# Patient Record
Sex: FEMALE | URBAN - METROPOLITAN AREA
[De-identification: names, ages, dates, MRNs, and addresses within clinical notes are randomized per-mention and may not be internally consistent; named-entity substitution may affect disease eponyms.]

---

## 2023-05-24 ENCOUNTER — OFFICE VISIT (OUTPATIENT)
Age: 68
End: 2023-05-24
Payer: MEDICARE

## 2023-05-24 VITALS
WEIGHT: 169 LBS | OXYGEN SATURATION: 97 % | RESPIRATION RATE: 17 BRPM | SYSTOLIC BLOOD PRESSURE: 130 MMHG | DIASTOLIC BLOOD PRESSURE: 80 MMHG | HEIGHT: 61 IN | BODY MASS INDEX: 31.91 KG/M2 | HEART RATE: 85 BPM

## 2023-05-24 DIAGNOSIS — R09.89 GLOBUS PHARYNGEUS: ICD-10-CM

## 2023-05-24 DIAGNOSIS — J38.7 VALLECULAR CYST: ICD-10-CM

## 2023-05-24 DIAGNOSIS — H60.8X3 CHRONIC ECZEMATOUS OTITIS EXTERNA OF BOTH EARS: ICD-10-CM

## 2023-05-24 DIAGNOSIS — J31.0 CHRONIC RHINITIS: ICD-10-CM

## 2023-05-24 DIAGNOSIS — R09.82 PND (POST-NASAL DRIP): ICD-10-CM

## 2023-05-24 DIAGNOSIS — E04.2 MULTINODULAR GOITER: Primary | ICD-10-CM

## 2023-05-24 PROCEDURE — G8427 DOCREV CUR MEDS BY ELIG CLIN: HCPCS | Performed by: OTOLARYNGOLOGY

## 2023-05-24 PROCEDURE — G8400 PT W/DXA NO RESULTS DOC: HCPCS | Performed by: OTOLARYNGOLOGY

## 2023-05-24 PROCEDURE — G8417 CALC BMI ABV UP PARAM F/U: HCPCS | Performed by: OTOLARYNGOLOGY

## 2023-05-24 PROCEDURE — 1036F TOBACCO NON-USER: CPT | Performed by: OTOLARYNGOLOGY

## 2023-05-24 PROCEDURE — 99204 OFFICE O/P NEW MOD 45 MIN: CPT | Performed by: OTOLARYNGOLOGY

## 2023-05-24 PROCEDURE — 1123F ACP DISCUSS/DSCN MKR DOCD: CPT | Performed by: OTOLARYNGOLOGY

## 2023-05-24 PROCEDURE — 3017F COLORECTAL CA SCREEN DOC REV: CPT | Performed by: OTOLARYNGOLOGY

## 2023-05-24 PROCEDURE — 1090F PRES/ABSN URINE INCON ASSESS: CPT | Performed by: OTOLARYNGOLOGY

## 2023-05-24 PROCEDURE — 31575 DIAGNOSTIC LARYNGOSCOPY: CPT | Performed by: OTOLARYNGOLOGY

## 2023-05-24 RX ORDER — EZETIMIBE 10 MG/1
TABLET ORAL
COMMUNITY
Start: 2023-04-03

## 2023-05-24 RX ORDER — LISINOPRIL 10 MG/1
TABLET ORAL
COMMUNITY
Start: 2023-04-24

## 2023-05-24 RX ORDER — AZELASTINE 1 MG/ML
1 SPRAY, METERED NASAL 2 TIMES DAILY
Qty: 60 ML | Refills: 1 | Status: SHIPPED | OUTPATIENT
Start: 2023-05-24

## 2023-05-24 RX ORDER — OMEPRAZOLE 40 MG/1
CAPSULE, DELAYED RELEASE ORAL
COMMUNITY
Start: 2023-03-30

## 2023-05-24 RX ORDER — DORZOLAMIDE HYDROCHLORIDE AND TIMOLOL MALEATE 20; 5 MG/ML; MG/ML
SOLUTION/ DROPS OPHTHALMIC
COMMUNITY

## 2023-05-24 RX ORDER — FLUOCINOLONE ACETONIDE 0.11 MG/ML
OIL AURICULAR (OTIC)
Qty: 20 ML | Refills: 0 | Status: SHIPPED | OUTPATIENT
Start: 2023-05-24

## 2023-05-24 RX ORDER — MONTELUKAST SODIUM 10 MG/1
10 TABLET ORAL NIGHTLY
Qty: 30 TABLET | Refills: 5 | Status: SHIPPED | OUTPATIENT
Start: 2023-05-24

## 2023-05-24 RX ORDER — ASPIRIN 81 MG/1
81 TABLET ORAL DAILY
COMMUNITY

## 2023-05-24 RX ORDER — AMLODIPINE BESYLATE 5 MG/1
TABLET ORAL
COMMUNITY

## 2023-05-24 ASSESSMENT — ENCOUNTER SYMPTOMS
TROUBLE SWALLOWING: 0
BACK PAIN: 0
EYE DISCHARGE: 0
VOICE CHANGE: 0
SINUS PAIN: 0
SINUS PRESSURE: 0
VOMITING: 0
APNEA: 0
COUGH: 0
SORE THROAT: 1
STRIDOR: 0
NAUSEA: 0
ABDOMINAL PAIN: 0
CHOKING: 0
PHOTOPHOBIA: 0
WHEEZING: 0
EYE ITCHING: 0
SHORTNESS OF BREATH: 0

## 2023-05-24 NOTE — PROGRESS NOTES
Subjective:    Chip Rodriguez   69 y.o.   1955     New Patient Visit    History of Present Illness:    5/24/23 - [address] office  68 yo female presents with ENT issues including ear itching, mucus in throat, occ sore throats; increased nasal mucus in the morning. Symptoms are worse at night. PPI seems to help at times. HOB elevation also helps. She has also been doing sinus rinse. PPI seemed to cause constipation. She has tried nasal steroids and mucinex in past.    Associated symptoms of a multinodular goiter, followed by surgery and endocrinology. She denies any trouble breathing as yet, but does have a globus sensation intermittently. Review of Systems  Review of Systems   Constitutional:  Negative for appetite change, chills, fatigue and fever. HENT:  Positive for postnasal drip and sore throat. Negative for congestion, ear discharge, ear pain, nosebleeds, sinus pressure, sinus pain, sneezing, tinnitus, trouble swallowing and voice change. Eyes:  Negative for photophobia, discharge, itching and visual disturbance. Respiratory:  Negative for apnea, cough, choking, shortness of breath, wheezing and stridor. Cardiovascular:  Negative for chest pain and palpitations. Gastrointestinal:  Negative for abdominal pain, nausea and vomiting. Endocrine: Negative for cold intolerance and heat intolerance. Genitourinary:  Negative for difficulty urinating and dysuria. Musculoskeletal:  Negative for arthralgias, back pain, gait problem, myalgias, neck pain and neck stiffness. Skin:  Negative for rash and wound. Allergic/Immunologic: Positive for environmental allergies. Negative for food allergies. Neurological:  Negative for dizziness, speech difficulty, light-headedness and headaches. Hematological:  Negative for adenopathy. Does not bruise/bleed easily. Psychiatric/Behavioral:  Negative for confusion and sleep disturbance. The patient is not nervous/anxious.           Past Medical

## 2023-07-11 ENCOUNTER — OFFICE VISIT (OUTPATIENT)
Age: 68
End: 2023-07-11
Payer: MEDICARE

## 2023-07-11 ENCOUNTER — NURSE ONLY (OUTPATIENT)
Age: 68
End: 2023-07-11
Payer: MEDICARE

## 2023-07-11 VITALS — OXYGEN SATURATION: 99 % | DIASTOLIC BLOOD PRESSURE: 70 MMHG | SYSTOLIC BLOOD PRESSURE: 124 MMHG | HEART RATE: 48 BPM

## 2023-07-11 VITALS
SYSTOLIC BLOOD PRESSURE: 124 MMHG | HEIGHT: 61 IN | WEIGHT: 169 LBS | OXYGEN SATURATION: 98 % | DIASTOLIC BLOOD PRESSURE: 70 MMHG | BODY MASS INDEX: 31.91 KG/M2

## 2023-07-11 DIAGNOSIS — R09.82 PND (POST-NASAL DRIP): ICD-10-CM

## 2023-07-11 DIAGNOSIS — J30.89 ALLERGIC RHINITIS DUE TO OTHER ALLERGIC TRIGGER, UNSPECIFIED SEASONALITY: Primary | ICD-10-CM

## 2023-07-11 DIAGNOSIS — E04.2 MULTINODULAR GOITER: ICD-10-CM

## 2023-07-11 DIAGNOSIS — J31.0 CHRONIC RHINITIS: ICD-10-CM

## 2023-07-11 PROCEDURE — G8427 DOCREV CUR MEDS BY ELIG CLIN: HCPCS | Performed by: OTOLARYNGOLOGY

## 2023-07-11 PROCEDURE — G8417 CALC BMI ABV UP PARAM F/U: HCPCS | Performed by: OTOLARYNGOLOGY

## 2023-07-11 PROCEDURE — 1123F ACP DISCUSS/DSCN MKR DOCD: CPT | Performed by: OTOLARYNGOLOGY

## 2023-07-11 PROCEDURE — 1090F PRES/ABSN URINE INCON ASSESS: CPT | Performed by: OTOLARYNGOLOGY

## 2023-07-11 PROCEDURE — 95004 PERQ TESTS W/ALRGNC XTRCS: CPT | Performed by: OTOLARYNGOLOGY

## 2023-07-11 PROCEDURE — 99214 OFFICE O/P EST MOD 30 MIN: CPT | Performed by: OTOLARYNGOLOGY

## 2023-07-11 PROCEDURE — 95024 IQ TESTS W/ALLERGENIC XTRCS: CPT | Performed by: OTOLARYNGOLOGY

## 2023-07-11 PROCEDURE — G8400 PT W/DXA NO RESULTS DOC: HCPCS | Performed by: OTOLARYNGOLOGY

## 2023-07-11 PROCEDURE — 1036F TOBACCO NON-USER: CPT | Performed by: OTOLARYNGOLOGY

## 2023-07-11 PROCEDURE — 3017F COLORECTAL CA SCREEN DOC REV: CPT | Performed by: OTOLARYNGOLOGY

## 2023-07-11 ASSESSMENT — ENCOUNTER SYMPTOMS
EYE ITCHING: 0
NAUSEA: 0
TROUBLE SWALLOWING: 0
WHEEZING: 0
ABDOMINAL PAIN: 0
BACK PAIN: 0
CHOKING: 0
EYE DISCHARGE: 0
VOMITING: 0
SINUS PRESSURE: 0
VOICE CHANGE: 0
PHOTOPHOBIA: 0
COUGH: 0
SHORTNESS OF BREATH: 0
SINUS PAIN: 0
STRIDOR: 0
APNEA: 0
SORE THROAT: 1

## 2023-07-11 NOTE — PROGRESS NOTES
appropriate;  Cranial Nerves: cranial nerves intact; Assessment/Plan:       ICD-10-CM    1. Allergic rhinitis due to other allergic trigger, unspecified seasonality  J30.89       2. Multinodular goiter  E04.2       3. PND (post-nasal drip)  R09.82       4. Chronic rhinitis  J31.0         Scope exam from before is overall clear other than incidental small left vallecular cyst.    I reviewed her surgery notes, agree she is a candidate for thyroidectomy - some of her throat symptoms could be secondary to compression. She has rhinitis/PND and possible chronic sinus disease as well. Doing better on astelin. Allergy testing reviewed today showing mild allergy to dust, mouse, clado mold. I recommended continued medical management she is not a good candidate for immunotherapy at this time. However with her allergy testing being relatively mild, her throat symptoms may be more attributable to her thyroid and reflux problems. She will continue follow-up with PCP and her thyroid surgeon. No orders of the defined types were placed in this encounter. No follow-ups on file. Thank you for referring this patient,    Deepak Rahman MD, [address] [facility]    [address] [facility] [address]  [facility], [address] [place] [address] PN. VUCXTSH TFYE [address]

## 2023-11-24 DIAGNOSIS — J30.89 ALLERGIC RHINITIS DUE TO OTHER ALLERGIC TRIGGER, UNSPECIFIED SEASONALITY: Primary | ICD-10-CM

## 2023-11-24 RX ORDER — AZELASTINE 1 MG/ML
1 SPRAY, METERED NASAL 2 TIMES DAILY
Qty: 1 EACH | Refills: 2 | Status: SHIPPED | OUTPATIENT
Start: 2023-11-24 | End: 2024-02-22

## 2024-03-07 DIAGNOSIS — J30.89 ALLERGIC RHINITIS DUE TO OTHER ALLERGIC TRIGGER, UNSPECIFIED SEASONALITY: ICD-10-CM

## 2024-03-07 RX ORDER — AZELASTINE 1 MG/ML
1 SPRAY, METERED NASAL 2 TIMES DAILY
Qty: 1 EACH | Refills: 2 | OUTPATIENT
Start: 2024-03-07 | End: 2024-06-05

## 2024-03-08 RX ORDER — AZELASTINE HYDROCHLORIDE 137 UG/1
1 SPRAY, METERED NASAL 2 TIMES DAILY
Refills: 3 | OUTPATIENT
Start: 2024-03-08

## 2024-09-20 DIAGNOSIS — J30.89 ALLERGIC RHINITIS DUE TO OTHER ALLERGIC TRIGGER, UNSPECIFIED SEASONALITY: ICD-10-CM

## 2024-09-20 RX ORDER — AZELASTINE 1 MG/ML
SPRAY, METERED NASAL
Qty: 30 ML | Refills: 6 | OUTPATIENT
Start: 2024-09-20

## 2025-07-10 ENCOUNTER — OFFICE VISIT (OUTPATIENT)
Age: 70
End: 2025-07-10
Payer: MEDICARE

## 2025-07-10 VITALS
DIASTOLIC BLOOD PRESSURE: 74 MMHG | WEIGHT: 167.6 LBS | HEART RATE: 54 BPM | RESPIRATION RATE: 18 BRPM | BODY MASS INDEX: 31.64 KG/M2 | SYSTOLIC BLOOD PRESSURE: 118 MMHG | OXYGEN SATURATION: 97 % | HEIGHT: 61 IN

## 2025-07-10 DIAGNOSIS — J30.89 ALLERGIC RHINITIS DUE TO OTHER ALLERGIC TRIGGER, UNSPECIFIED SEASONALITY: Primary | ICD-10-CM

## 2025-07-10 DIAGNOSIS — R09.82 POSTNASAL DRIP: ICD-10-CM

## 2025-07-10 DIAGNOSIS — E04.2 MULTINODULAR GOITER: ICD-10-CM

## 2025-07-10 PROCEDURE — 3017F COLORECTAL CA SCREEN DOC REV: CPT

## 2025-07-10 PROCEDURE — G8417 CALC BMI ABV UP PARAM F/U: HCPCS

## 2025-07-10 PROCEDURE — G8400 PT W/DXA NO RESULTS DOC: HCPCS

## 2025-07-10 PROCEDURE — G8427 DOCREV CUR MEDS BY ELIG CLIN: HCPCS

## 2025-07-10 PROCEDURE — 99213 OFFICE O/P EST LOW 20 MIN: CPT

## 2025-07-10 PROCEDURE — 1036F TOBACCO NON-USER: CPT

## 2025-07-10 PROCEDURE — 1123F ACP DISCUSS/DSCN MKR DOCD: CPT

## 2025-07-10 PROCEDURE — 1090F PRES/ABSN URINE INCON ASSESS: CPT

## 2025-07-10 PROCEDURE — 1159F MED LIST DOCD IN RCRD: CPT

## 2025-07-10 RX ORDER — FLUTICASONE PROPIONATE 50 MCG
SPRAY, SUSPENSION (ML) NASAL
COMMUNITY
End: 2025-07-10 | Stop reason: SDUPTHER

## 2025-07-10 RX ORDER — BACILLUS COAGULANS 1B CELL
CAPSULE ORAL
COMMUNITY
Start: 2025-05-19

## 2025-07-10 RX ORDER — FUROSEMIDE 20 MG/1
20 TABLET ORAL EVERY MORNING
COMMUNITY
Start: 2025-04-02

## 2025-07-10 RX ORDER — BLOOD-GLUCOSE METER
KIT MISCELLANEOUS
COMMUNITY
Start: 2025-07-06

## 2025-07-10 RX ORDER — AZELASTINE 1 MG/ML
1 SPRAY, METERED NASAL 2 TIMES DAILY
Qty: 1 EACH | Refills: 2 | Status: SHIPPED | OUTPATIENT
Start: 2025-07-10 | End: 2025-10-08

## 2025-07-10 RX ORDER — FLUTICASONE PROPIONATE 50 MCG
2 SPRAY, SUSPENSION (ML) NASAL DAILY
Qty: 16 G | Refills: 2 | Status: SHIPPED | OUTPATIENT
Start: 2025-07-10 | End: 2025-10-08

## 2025-07-10 NOTE — PROGRESS NOTES
[facility] [place] [facility]  Follow-up Visit    Patient: Yessi Dorsey  YOB: 1955  MRN: [id number]  Date of Service:  7/10/2025    Subjective:     Chief Complaint: Nasal congestion    History of Present Illness:     Initial HPI: 5/24/23 5/24/23 -  office  68 yo female presents with ENT issues including ear itching, mucus in throat, occ sore throats; increased nasal mucus in the morning. Symptoms are worse at night.  PPI seems to help at times.  HOB elevation also helps.  She has also been doing sinus rinse. PPI seemed to cause constipation. She has tried nasal steroids and mucinex in past.     Associated symptoms of a multinodular goiter, followed by surgery and endocrinology.  She denies any trouble breathing as yet, but does have a globus sensation intermittently.    Interval History:    7/11/2023  6 week f/u overall doing well on astelin and saline.  She feels like it is helping.  Had allergy testing.  No new issues otherwise.    7/10/2025  Was previously seen by Dr. Chowdhury in 2023 for multinodular goiter, allergic rhinitis, postnasal drip, globus sensation.  Was recommended for a thyroidectomy at that time. States she was seeing surgical oncology at the time, and was scheduled for the surgery, but elected not to proceed, as she does not believe the thyroid is causing her any problems.  Is following up with endocrinology for her thyroid.  Presents today for evaluation of frequent nasal congestion.  Has been seeing another ENT over the past 2 years for her allergies.    Longstanding history of allergies. Has used Flonase and azelastine in the past but is not currently using any nasal sprays or antihistamines. Notes a history of chronic sinus issues that typically are worse when she is not using the nasal sprays.  Has had 1-2 sinus infections in the past year. Currently is having frequent nasal congestion with clear nasal drainage, intermittent throat irritation, frequent popping/clicking of the